# Patient Record
Sex: FEMALE | URBAN - METROPOLITAN AREA
[De-identification: names, ages, dates, MRNs, and addresses within clinical notes are randomized per-mention and may not be internally consistent; named-entity substitution may affect disease eponyms.]

---

## 2020-01-20 ENCOUNTER — APPOINTMENT (OUTPATIENT)
Age: 40
Setting detail: DERMATOLOGY
End: 2020-01-20

## 2020-01-20 DIAGNOSIS — L98.8 OTHER SPECIFIED DISORDERS OF THE SKIN AND SUBCUTANEOUS TISSUE: ICD-10-CM

## 2020-01-20 DIAGNOSIS — L90.8 OTHER ATROPHIC DISORDERS OF SKIN: ICD-10-CM

## 2020-01-20 PROCEDURE — OTHER ADDITIONAL NOTES: OTHER

## 2020-01-20 PROCEDURE — OTHER FILLERS: OTHER

## 2020-01-20 NOTE — PROCEDURE: ADDITIONAL NOTES
Additional Notes: Stephanie 52 units.  Lot [id number]. Exp 11/ 2021 Additional Notes: Stephanie 52 units.  Lot [id number]. Exp 11/ 2021

## 2020-05-22 ENCOUNTER — APPOINTMENT (OUTPATIENT)
Age: 40
Setting detail: DERMATOLOGY
End: 2020-06-12

## 2020-05-22 DIAGNOSIS — L98.8 OTHER SPECIFIED DISORDERS OF THE SKIN AND SUBCUTANEOUS TISSUE: ICD-10-CM

## 2020-05-22 DIAGNOSIS — L90.8 OTHER ATROPHIC DISORDERS OF SKIN: ICD-10-CM

## 2020-05-22 PROCEDURE — OTHER ADDITIONAL NOTES: OTHER

## 2020-05-22 PROCEDURE — OTHER RESTYLANE INJECTION: OTHER

## 2020-05-22 NOTE — PROCEDURE: ADDITIONAL NOTES
Additional Notes: Stephanie 52 units. Lot [id number], exp 4/22 Additional Notes: Stephanie 52 units. Lot [id number], exp 4/22

## 2021-04-30 ENCOUNTER — APPOINTMENT (OUTPATIENT)
Age: 41
Setting detail: DERMATOLOGY
End: 2021-08-22

## 2021-04-30 DIAGNOSIS — L98.8 OTHER SPECIFIED DISORDERS OF THE SKIN AND SUBCUTANEOUS TISSUE: ICD-10-CM

## 2021-04-30 PROCEDURE — OTHER ADDITIONAL NOTES: OTHER

## 2021-04-30 NOTE — PROCEDURE: ADDITIONAL NOTES
Additional Notes: [employer] 52 units lot number [id number] exp 11/2022. Additional Notes: [employer] 52 units lot number [id number] exp 11/2022.

## 2021-08-04 ENCOUNTER — APPOINTMENT (OUTPATIENT)
Dept: URBAN - METROPOLITAN AREA CLINIC 265 | Age: 41
Setting detail: DERMATOLOGY
End: 2021-08-22

## 2021-08-04 DIAGNOSIS — L98.8 OTHER SPECIFIED DISORDERS OF THE SKIN AND SUBCUTANEOUS TISSUE: ICD-10-CM

## 2021-08-04 PROCEDURE — OTHER ADDITIONAL NOTES: OTHER

## 2021-08-04 NOTE — PROCEDURE: ADDITIONAL NOTES
Detail Level: Simple
Additional Notes: [employer] 54 units [id number] exp 02/2024
Render Risk Assessment In Note?: no

## 2022-01-04 ENCOUNTER — APPOINTMENT (OUTPATIENT)
Dept: URBAN - METROPOLITAN AREA CLINIC 265 | Age: 42
Setting detail: DERMATOLOGY
End: 2022-01-04

## 2022-10-19 ENCOUNTER — APPOINTMENT (OUTPATIENT)
Dept: URBAN - METROPOLITAN AREA CLINIC 265 | Age: 42
Setting detail: DERMATOLOGY
End: 2022-10-26

## 2022-10-19 DIAGNOSIS — L98.8 OTHER SPECIFIED DISORDERS OF THE SKIN AND SUBCUTANEOUS TISSUE: ICD-10-CM

## 2022-10-19 PROCEDURE — OTHER DYSPORT: OTHER

## 2024-07-02 ENCOUNTER — APPOINTMENT (OUTPATIENT)
Dept: URBAN - METROPOLITAN AREA CLINIC 298 | Age: 44
Setting detail: DERMATOLOGY
End: 2024-07-02

## 2024-07-02 DIAGNOSIS — L98.8 OTHER SPECIFIED DISORDERS OF THE SKIN AND SUBCUTANEOUS TISSUE: ICD-10-CM

## 2024-07-02 PROCEDURE — OTHER JEUVEAU: OTHER

## 2024-07-02 NOTE — PROCEDURE: JEUVEAU
Show Depressor Anguli Units: Yes
Periorbital Skin Units: 0
Show Mentalis Units: No
Detail Level: Detailed
Additional Area 1 Location: upper / lower lip
Forehead Units: 14
Glabellar Complex Units: 20
Post-Care Instructions: Patient instructed to not lie down for 4 hours and limit physical activity for 24 hours.
Additional Area 2 Location: chin
Dilution (U/0.1 Cc): 1
Show Inventory Tab: Show
Consent: Written consent obtained. Risks include but not limited to lid/brow ptosis, bruising, swelling, diplopia, temporary effect, incomplete chemical denervation.

## 2024-07-16 ENCOUNTER — APPOINTMENT (OUTPATIENT)
Dept: URBAN - METROPOLITAN AREA CLINIC 298 | Age: 44
Setting detail: DERMATOLOGY
End: 2024-07-22

## 2024-07-16 DIAGNOSIS — L98.8 OTHER SPECIFIED DISORDERS OF THE SKIN AND SUBCUTANEOUS TISSUE: ICD-10-CM

## 2024-07-16 PROCEDURE — OTHER JEUVEAU: OTHER

## 2024-07-16 NOTE — PROCEDURE: JEUVEAU
Nasal Root Units: 0
Show Periorbital Units: Yes
Show Mentalis Units: No
Consent: Written consent obtained. Risks include but not limited to lid/brow ptosis, bruising, swelling, diplopia, temporary effect, incomplete chemical denervation.
Detail Level: Detailed
Forehead Units: 2
Additional Area 1 Location: chin
Post-Care Instructions: Patient instructed to not lie down for 4 hours and limit physical activity for 24 hours.
Dilution (U/0.1 Cc): 1
Show Inventory Tab: Show